# Patient Record
Sex: FEMALE | Race: AMERICAN INDIAN OR ALASKA NATIVE
[De-identification: names, ages, dates, MRNs, and addresses within clinical notes are randomized per-mention and may not be internally consistent; named-entity substitution may affect disease eponyms.]

---

## 2022-01-18 ENCOUNTER — HOSPITAL ENCOUNTER (INPATIENT)
Dept: HOSPITAL 26 - MED | Age: 72
LOS: 10 days | Discharge: HOME | DRG: 69 | End: 2022-01-28
Attending: INTERNAL MEDICINE | Admitting: INTERNAL MEDICINE
Payer: COMMERCIAL

## 2022-01-18 VITALS — HEIGHT: 62 IN | WEIGHT: 173 LBS | BODY MASS INDEX: 31.83 KG/M2

## 2022-01-18 VITALS — DIASTOLIC BLOOD PRESSURE: 68 MMHG | SYSTOLIC BLOOD PRESSURE: 179 MMHG

## 2022-01-18 DIAGNOSIS — G45.9: Primary | ICD-10-CM

## 2022-01-18 DIAGNOSIS — I10: ICD-10-CM

## 2022-01-18 DIAGNOSIS — G43.909: ICD-10-CM

## 2022-01-18 DIAGNOSIS — E78.5: ICD-10-CM

## 2022-01-18 DIAGNOSIS — Z20.822: ICD-10-CM

## 2022-01-18 DIAGNOSIS — Z79.899: ICD-10-CM

## 2022-01-18 DIAGNOSIS — Z91.013: ICD-10-CM

## 2022-01-18 DIAGNOSIS — Z88.0: ICD-10-CM

## 2022-01-18 LAB
ALBUMIN FLD-MCNC: 3.4 G/DL (ref 3.4–5)
ANION GAP SERPL CALCULATED.3IONS-SCNC: 11.5 MMOL/L (ref 8–16)
AST SERPL-CCNC: 39 U/L (ref 15–37)
BASOPHILS # BLD AUTO: 0 K/UL (ref 0–0.22)
BASOPHILS NFR BLD AUTO: 0.2 % (ref 0–2)
BILIRUB SERPL-MCNC: 0.5 MG/DL (ref 0–1)
BUN SERPL-MCNC: 13 MG/DL (ref 7–18)
CHLORIDE SERPL-SCNC: 103 MMOL/L (ref 98–107)
CHOLEST/HDLC SERPL: 3 {RATIO} (ref 1–4.5)
CO2 SERPL-SCNC: 28 MMOL/L (ref 21–32)
CREAT SERPL-MCNC: 0.7 MG/DL (ref 0.6–1.3)
EOSINOPHIL # BLD AUTO: 0.1 K/UL (ref 0–0.4)
EOSINOPHIL NFR BLD AUTO: 1.5 % (ref 0–4)
ERYTHROCYTE [DISTWIDTH] IN BLOOD BY AUTOMATED COUNT: 13.7 % (ref 11.6–13.7)
GFR SERPL CREATININE-BSD FRML MDRD: 107 ML/MIN (ref 90–?)
GLUCOSE SERPL-MCNC: 176 MG/DL (ref 74–106)
HCT VFR BLD AUTO: 36.2 % (ref 36–48)
HDLC SERPL-MCNC: 46 MG/DL (ref 40–60)
HGB BLD-MCNC: 12.1 G/DL (ref 12–16)
LDLC SERPL CALC-MCNC: 68 MG/DL (ref 60–100)
LYMPHOCYTES # BLD AUTO: 1.9 K/UL (ref 2.5–16.5)
LYMPHOCYTES NFR BLD AUTO: 29.9 % (ref 20.5–51.1)
MCH RBC QN AUTO: 32 PG (ref 27–31)
MCHC RBC AUTO-ENTMCNC: 33 G/DL (ref 33–37)
MCV RBC AUTO: 94.5 FL (ref 80–94)
MONOCYTES # BLD AUTO: 0.5 K/UL (ref 0.8–1)
MONOCYTES NFR BLD AUTO: 7.2 % (ref 1.7–9.3)
NEUTROPHILS # BLD AUTO: 3.8 K/UL (ref 1.8–7.7)
NEUTROPHILS NFR BLD AUTO: 61.2 % (ref 42.2–75.2)
PLATELET # BLD AUTO: 239 K/UL (ref 140–450)
POTASSIUM SERPL-SCNC: 4.5 MMOL/L (ref 3.5–5.1)
RBC # BLD AUTO: 3.83 MIL/UL (ref 4.2–5.4)
SODIUM SERPL-SCNC: 138 MMOL/L (ref 136–145)
TRIGL SERPL-MCNC: 130 MG/DL (ref 30–150)
WBC # BLD AUTO: 6.3 K/UL (ref 4.8–10.8)

## 2022-01-18 PROCEDURE — C9113 INJ PANTOPRAZOLE SODIUM, VIA: HCPCS

## 2022-01-18 PROCEDURE — G0378 HOSPITAL OBSERVATION PER HR: HCPCS

## 2022-01-18 NOTE — NUR
71/F BIB  WITH C/O HEADACHE, RIGHT ARM WEAKNESS AND CHEST PRESSURE X2 
DAYS, REPORTS THROBBING NON RADIATING PAIN THAT IS INTERMITTENT. PATIENT STATES 
SHE CALLED HER PCP TO INFORM HIM OF HER SYMPTOMS AND WAS REFERRED TO COME TO 
THE ED. PATIENT DENIES N/V/D, DIZZINESS, SOB, FEVERS OR CHILLS, PATIENT DENIES 
PAIN AT THIS TIME.

## 2022-01-18 NOTE — NUR
PATIENT SITTING UP IN CHAIR WITH EYES CLOSED, WILL CONTINUE TO MONITOR. ALL 
NEEDS MET AT THIS TIME.

## 2022-01-19 LAB
ALBUMIN FLD-MCNC: 3.4 G/DL (ref 3.4–5)
ANION GAP SERPL CALCULATED.3IONS-SCNC: 15.5 MMOL/L (ref 8–16)
AST SERPL-CCNC: 17 U/L (ref 15–37)
BASOPHILS # BLD AUTO: 0 K/UL (ref 0–0.22)
BASOPHILS NFR BLD AUTO: 0 % (ref 0–2)
BILIRUB SERPL-MCNC: 0.3 MG/DL (ref 0–1)
BUN SERPL-MCNC: 17 MG/DL (ref 7–18)
CHLORIDE SERPL-SCNC: 103 MMOL/L (ref 98–107)
CO2 SERPL-SCNC: 23.8 MMOL/L (ref 21–32)
CREAT SERPL-MCNC: 0.9 MG/DL (ref 0.6–1.3)
EOSINOPHIL # BLD AUTO: 0 K/UL (ref 0–0.4)
EOSINOPHIL NFR BLD AUTO: 0 % (ref 0–4)
ERYTHROCYTE [DISTWIDTH] IN BLOOD BY AUTOMATED COUNT: 14.1 % (ref 11.6–13.7)
GFR SERPL CREATININE-BSD FRML MDRD: 80 ML/MIN (ref 90–?)
GLUCOSE SERPL-MCNC: 272 MG/DL (ref 74–106)
HCT VFR BLD AUTO: 40.7 % (ref 36–48)
HGB BLD-MCNC: 13.5 G/DL (ref 12–16)
LYMPHOCYTES # BLD AUTO: 1.2 K/UL (ref 2.5–16.5)
LYMPHOCYTES NFR BLD AUTO: 12 % (ref 20.5–51.1)
MCH RBC QN AUTO: 32 PG (ref 27–31)
MCHC RBC AUTO-ENTMCNC: 33 G/DL (ref 33–37)
MCV RBC AUTO: 95.4 FL (ref 80–94)
MONOCYTES # BLD AUTO: 0.2 K/UL (ref 0.8–1)
MONOCYTES NFR BLD AUTO: 1.9 % (ref 1.7–9.3)
NEUTROPHILS # BLD AUTO: 8.6 K/UL (ref 1.8–7.7)
NEUTROPHILS NFR BLD AUTO: 86.1 % (ref 42.2–75.2)
PLATELET # BLD AUTO: 277 K/UL (ref 140–450)
POTASSIUM SERPL-SCNC: 4.3 MMOL/L (ref 3.5–5.1)
RBC # BLD AUTO: 4.26 MIL/UL (ref 4.2–5.4)
SODIUM SERPL-SCNC: 138 MMOL/L (ref 136–145)
WBC # BLD AUTO: 10 K/UL (ref 4.8–10.8)

## 2022-01-19 RX ADMIN — DOCUSATE SODIUM SCH MG: 100 CAPSULE, LIQUID FILLED ORAL at 13:06

## 2022-01-19 NOTE — NUR
PATIENT HAS BEEN SCREENED AND CATEGORIZED AS MODERATE NUTRITION RISK. PATIENT WILL BE SEEN 
WITHIN 3-5 DAYS OF ADMISSION.



1/19/221/23/22



NYLA GRAJEDA RD

## 2022-01-20 LAB
ALBUMIN FLD-MCNC: 2.9 G/DL (ref 3.4–5)
ANION GAP SERPL CALCULATED.3IONS-SCNC: 12.9 MMOL/L (ref 8–16)
AST SERPL-CCNC: 15 U/L (ref 15–37)
BASOPHILS # BLD AUTO: 0 K/UL (ref 0–0.22)
BASOPHILS NFR BLD AUTO: 0.4 % (ref 0–2)
BILIRUB SERPL-MCNC: 0.3 MG/DL (ref 0–1)
BUN SERPL-MCNC: 23 MG/DL (ref 7–18)
CHLORIDE SERPL-SCNC: 108 MMOL/L (ref 98–107)
CO2 SERPL-SCNC: 27.2 MMOL/L (ref 21–32)
CREAT SERPL-MCNC: 0.9 MG/DL (ref 0.6–1.3)
EOSINOPHIL # BLD AUTO: 0 K/UL (ref 0–0.4)
EOSINOPHIL NFR BLD AUTO: 0.3 % (ref 0–4)
ERYTHROCYTE [DISTWIDTH] IN BLOOD BY AUTOMATED COUNT: 14.2 % (ref 11.6–13.7)
GFR SERPL CREATININE-BSD FRML MDRD: 80 ML/MIN (ref 90–?)
GLUCOSE SERPL-MCNC: 137 MG/DL (ref 74–106)
HCT VFR BLD AUTO: 37.6 % (ref 36–48)
HGB BLD-MCNC: 12.3 G/DL (ref 12–16)
LYMPHOCYTES # BLD AUTO: 2.6 K/UL (ref 2.5–16.5)
LYMPHOCYTES NFR BLD AUTO: 24.3 % (ref 20.5–51.1)
MCH RBC QN AUTO: 31 PG (ref 27–31)
MCHC RBC AUTO-ENTMCNC: 33 G/DL (ref 33–37)
MCV RBC AUTO: 95.9 FL (ref 80–94)
MONOCYTES # BLD AUTO: 0.7 K/UL (ref 0.8–1)
MONOCYTES NFR BLD AUTO: 6.4 % (ref 1.7–9.3)
NEUTROPHILS # BLD AUTO: 7.2 K/UL (ref 1.8–7.7)
NEUTROPHILS NFR BLD AUTO: 68.6 % (ref 42.2–75.2)
PLATELET # BLD AUTO: 219 K/UL (ref 140–450)
POTASSIUM SERPL-SCNC: 4.1 MMOL/L (ref 3.5–5.1)
RBC # BLD AUTO: 3.93 MIL/UL (ref 4.2–5.4)
SODIUM SERPL-SCNC: 144 MMOL/L (ref 136–145)
WBC # BLD AUTO: 10.5 K/UL (ref 4.8–10.8)

## 2022-01-20 RX ADMIN — ASPIRIN SCH MG: 325 TABLET, COATED ORAL at 09:02

## 2022-01-20 RX ADMIN — DOCUSATE SODIUM SCH MG: 100 CAPSULE, LIQUID FILLED ORAL at 09:02

## 2022-01-20 NOTE — NUR
REPORT RECEIVED FROM ELO HUDSON FOR CONTINUITY OF CARE. PT IS A&OX4. ON ROOM AIR. 
IV SITE RT HAND 22G, INTACT, PATENT, GOOD BLOOD RETURN. SKIN INTACT, WARM AND 
DRY. WILL CONTINUE TO MONITOR.

## 2022-01-20 NOTE — NUR
Patient resting in bed. Vital Signs within normal limits. Respirations even and 
unlabored. Chest rise is symmetrical. Will continue to monitor.

## 2022-01-20 NOTE — NUR
DC PLANNING:

CM SPOKE WITH [facility], CM NOT AVAILABLE TO TALK, MESSAGE LEFT THAT NEUROLOGIST IS RECOMMENDING 
MRI, NEED AUTH AND DIRECTION ON WHERE TO SEND.

CM WILL FOLLOW.

-------------------------------------------------------------------------------

Addendum: 01/20/22 at 1547 by Nancy Reyes CM

-------------------------------------------------------------------------------

Called & spoke with Gabriele at [facility], ph [phone] opt 1 fax [phone], states not 
showing in system to fax face sheet & clinicals, she is the one following pt. Informed UR & 
going to fax. I faxed order/pt info to Gabriele for MRI.

-------------------------------------------------------------------------------

Addendum: 01/21/22 at 1659 by Maria Isabel Castañeda CM

-------------------------------------------------------------------------------

DC PLANNING:

CM SPOKE WITH GABRIELE AT [facility], NO CONTRACTED FACILITIES IN NETWORK ABLE TO DO MRI.

CM WILL FOLLOW.

-------------------------------------------------------------------------------

Addendum: 01/25/22 at 1502 by Maria Isabel Castañeda CM

-------------------------------------------------------------------------------

DC PLANNING:

MUKESH SPOKE WITH NOMI AT [facility] ([phone] OPT 1), NO CONTRACTED HOSPITALS AS YET TO 
TRANSFER THE PATIENT TO FOR AN MRI.

MUKESH WILL FOLLOW.

-------------------------------------------------------------------------------

Addendum: 01/26/22 at 1738 by Maria Isabel Castañeda 

-------------------------------------------------------------------------------

DC PLANNING:

MUKESH SPOKE WITH THE MEDICAL DIRECTOR DR HANEY AT [facility], NO CONTRACTED FACILITIES ARE ABLE TO 
ACCEPT THE PATIENT FOR MRI. DR BANDA SPOKE WITH DR BRAVO, HE WANTS THE MRI DONE BEFORE 
PATIENT DISCHARGES. MUKESH ATTEMPTED TO REACH [facility] TO ASK FOR AN AUTH TO SEND THE PATIENT TO 
[place] FOR HER MRI.

MUKESH WILL FOLLOW UP.

-------------------------------------------------------------------------------

Addendum: 01/27/22 at 1133 by Maria Isabel Castañeda CM

-------------------------------------------------------------------------------

DC PLANNING:

[facility] AUTH TO ARRANGE AN MRI AT 
[place] FOR THE PATIENT. AUTH # [id number], TRANSPORT AUTH WILL BE PROVIDED ONCE [facility] HAS A 
SCHEDULED TIME FOR . CM SPOKE WITH JOHNNIE, THEY WILL FAX THE MRI FORM, CM WILL THEN 
FAX COMPLETED CLINICAL PACKET TO THEM, AND WILL FOLLOW.

-------------------------------------------------------------------------------

Addendum: 01/27/22 at 1402 by Maria Isabel Castañeda CM

-------------------------------------------------------------------------------

DC PLANNING:

PATIENT TO BE PICKED UP BY [facility] BLS LEVEL AT 1550, [facility] SCHEDULED FOR A WAIT AND RETURN 
TRANSPORT. AUTH NUMBER FOR [facility] PER GABRIELE AT [facility] [id number], GABRIELE WILL FAX THE AUTH 
DIRECTLY TO [facility].

ABOVE ENDORSED TO PATIENTS NURSE FLO CM TO FOLLOW.

## 2022-01-20 NOTE — NUR
CALLED PT'S [facility]. UPDATED ON PT STATUS, ALL QUESTIONS AND CONCERNS 
ANSWERED AT THIS TIME.

## 2022-01-21 VITALS — DIASTOLIC BLOOD PRESSURE: 71 MMHG | SYSTOLIC BLOOD PRESSURE: 165 MMHG

## 2022-01-21 VITALS — SYSTOLIC BLOOD PRESSURE: 132 MMHG | DIASTOLIC BLOOD PRESSURE: 63 MMHG

## 2022-01-21 VITALS — SYSTOLIC BLOOD PRESSURE: 154 MMHG | DIASTOLIC BLOOD PRESSURE: 61 MMHG

## 2022-01-21 VITALS — SYSTOLIC BLOOD PRESSURE: 127 MMHG | DIASTOLIC BLOOD PRESSURE: 69 MMHG

## 2022-01-21 VITALS — SYSTOLIC BLOOD PRESSURE: 152 MMHG | DIASTOLIC BLOOD PRESSURE: 52 MMHG

## 2022-01-21 LAB
ALBUMIN FLD-MCNC: 3 G/DL (ref 3.4–5)
ANION GAP SERPL CALCULATED.3IONS-SCNC: 11.7 MMOL/L (ref 8–16)
AST SERPL-CCNC: 14 U/L (ref 15–37)
BASOPHILS # BLD AUTO: 0 K/UL (ref 0–0.22)
BASOPHILS NFR BLD AUTO: 0.3 % (ref 0–2)
BILIRUB SERPL-MCNC: 0.3 MG/DL (ref 0–1)
BUN SERPL-MCNC: 20 MG/DL (ref 7–18)
CHLORIDE SERPL-SCNC: 107 MMOL/L (ref 98–107)
CO2 SERPL-SCNC: 28.2 MMOL/L (ref 21–32)
CREAT SERPL-MCNC: 0.8 MG/DL (ref 0.6–1.3)
EOSINOPHIL # BLD AUTO: 0.1 K/UL (ref 0–0.4)
EOSINOPHIL NFR BLD AUTO: 1.2 % (ref 0–4)
ERYTHROCYTE [DISTWIDTH] IN BLOOD BY AUTOMATED COUNT: 14.2 % (ref 11.6–13.7)
GFR SERPL CREATININE-BSD FRML MDRD: 91 ML/MIN (ref 90–?)
GLUCOSE SERPL-MCNC: 143 MG/DL (ref 74–106)
HCT VFR BLD AUTO: 36.9 % (ref 36–48)
HGB BLD-MCNC: 12.4 G/DL (ref 12–16)
LYMPHOCYTES # BLD AUTO: 2.2 K/UL (ref 2.5–16.5)
LYMPHOCYTES NFR BLD AUTO: 28.4 % (ref 20.5–51.1)
MCH RBC QN AUTO: 32 PG (ref 27–31)
MCHC RBC AUTO-ENTMCNC: 34 G/DL (ref 33–37)
MCV RBC AUTO: 95 FL (ref 80–94)
MONOCYTES # BLD AUTO: 0.5 K/UL (ref 0.8–1)
MONOCYTES NFR BLD AUTO: 6.5 % (ref 1.7–9.3)
NEUTROPHILS # BLD AUTO: 5 K/UL (ref 1.8–7.7)
NEUTROPHILS NFR BLD AUTO: 63.6 % (ref 42.2–75.2)
PLATELET # BLD AUTO: 238 K/UL (ref 140–450)
POTASSIUM SERPL-SCNC: 3.9 MMOL/L (ref 3.5–5.1)
RBC # BLD AUTO: 3.89 MIL/UL (ref 4.2–5.4)
SODIUM SERPL-SCNC: 143 MMOL/L (ref 136–145)
WBC # BLD AUTO: 7.8 K/UL (ref 4.8–10.8)

## 2022-01-21 RX ADMIN — ASPIRIN SCH MG: 325 TABLET, COATED ORAL at 09:33

## 2022-01-21 RX ADMIN — DOCUSATE SODIUM SCH MG: 100 CAPSULE, LIQUID FILLED ORAL at 09:33

## 2022-01-21 RX ADMIN — ENOXAPARIN SODIUM SCH MG: 40 INJECTION SUBCUTANEOUS at 22:30

## 2022-01-21 NOTE — NUR
PATIENT TO FLOOR 2225 AWAKE ALERT NO C/O PATIENT STATES PUT GOWN ON PATIENT AND FALL RISK 
SLIPPERS. PATIENT CAN AMBULATE WELL STEADY ON FEET. PATIENT IS ROOM AIR, SAT 97 %. PATIENT 
LUNGS DIMINISH TO LISTEN SKIN INTACT. SINUS 

ON MONITOR. PATIENT HAS INFLUENZA B BUT DON,T HEAR A COUGH.. PATIENT IS COVID _.NO DISTRESS 
NOTED. PATIENT CAME 

FROM [facility] 2225.

## 2022-01-21 NOTE — NUR
Discharge Planning:

FROY has contacted [facility] ([phone] [facility]) 4 times and left message regarding pt's 
need for MRI. FROY received call back from [facility] stating that [facility] has no record of this 
patient being admitted to [place]. FROY reviewed admitting documentation over phone with 
[facility]. FROY has asked [facility] Admitting to assist with notifying [facility]. [facility] stated that she 
cannot assist with anything until patient is showing in her system. FROY reviewed notes that 
clinicals have been faxed to [facility]. 

-------------------------------------------------------------------------------

Addendum: 01/21/22 at 1551 by Tanya Daigle CM

-------------------------------------------------------------------------------

FROY received call from [facility]; pt was found in their system; Tracking Number: [id number]. 
[facility] has asked [facility] to work on finding a hospital where the pt can have MRI.

## 2022-01-22 VITALS — SYSTOLIC BLOOD PRESSURE: 134 MMHG | DIASTOLIC BLOOD PRESSURE: 60 MMHG

## 2022-01-22 VITALS — DIASTOLIC BLOOD PRESSURE: 61 MMHG | SYSTOLIC BLOOD PRESSURE: 130 MMHG

## 2022-01-22 VITALS — DIASTOLIC BLOOD PRESSURE: 76 MMHG | SYSTOLIC BLOOD PRESSURE: 158 MMHG

## 2022-01-22 VITALS — SYSTOLIC BLOOD PRESSURE: 158 MMHG | DIASTOLIC BLOOD PRESSURE: 72 MMHG

## 2022-01-22 VITALS — DIASTOLIC BLOOD PRESSURE: 57 MMHG | SYSTOLIC BLOOD PRESSURE: 136 MMHG

## 2022-01-22 VITALS — SYSTOLIC BLOOD PRESSURE: 140 MMHG | DIASTOLIC BLOOD PRESSURE: 58 MMHG

## 2022-01-22 LAB
ALBUMIN FLD-MCNC: 2.9 G/DL (ref 3.4–5)
ANION GAP SERPL CALCULATED.3IONS-SCNC: 11.3 MMOL/L (ref 8–16)
AST SERPL-CCNC: 18 U/L (ref 15–37)
BASOPHILS # BLD AUTO: 0 K/UL (ref 0–0.22)
BASOPHILS NFR BLD AUTO: 0.3 % (ref 0–2)
BILIRUB SERPL-MCNC: 0.3 MG/DL (ref 0–1)
BUN SERPL-MCNC: 18 MG/DL (ref 7–18)
CHLORIDE SERPL-SCNC: 106 MMOL/L (ref 98–107)
CO2 SERPL-SCNC: 27.6 MMOL/L (ref 21–32)
CREAT SERPL-MCNC: 0.8 MG/DL (ref 0.6–1.3)
EOSINOPHIL # BLD AUTO: 0.1 K/UL (ref 0–0.4)
EOSINOPHIL NFR BLD AUTO: 1.4 % (ref 0–4)
ERYTHROCYTE [DISTWIDTH] IN BLOOD BY AUTOMATED COUNT: 14 % (ref 11.6–13.7)
GFR SERPL CREATININE-BSD FRML MDRD: 91 ML/MIN (ref 90–?)
GLUCOSE SERPL-MCNC: 136 MG/DL (ref 74–106)
HCT VFR BLD AUTO: 35.6 % (ref 36–48)
HGB BLD-MCNC: 12.2 G/DL (ref 12–16)
LYMPHOCYTES # BLD AUTO: 2 K/UL (ref 2.5–16.5)
LYMPHOCYTES NFR BLD AUTO: 30 % (ref 20.5–51.1)
MCH RBC QN AUTO: 32 PG (ref 27–31)
MCHC RBC AUTO-ENTMCNC: 34 G/DL (ref 33–37)
MCV RBC AUTO: 94.2 FL (ref 80–94)
MONOCYTES # BLD AUTO: 0.5 K/UL (ref 0.8–1)
MONOCYTES NFR BLD AUTO: 6.7 % (ref 1.7–9.3)
NEUTROPHILS # BLD AUTO: 4.2 K/UL (ref 1.8–7.7)
NEUTROPHILS NFR BLD AUTO: 61.6 % (ref 42.2–75.2)
PLATELET # BLD AUTO: 253 K/UL (ref 140–450)
POTASSIUM SERPL-SCNC: 3.9 MMOL/L (ref 3.5–5.1)
RBC # BLD AUTO: 3.78 MIL/UL (ref 4.2–5.4)
SODIUM SERPL-SCNC: 141 MMOL/L (ref 136–145)
WBC # BLD AUTO: 6.8 K/UL (ref 4.8–10.8)

## 2022-01-22 RX ADMIN — ENOXAPARIN SODIUM SCH MG: 40 INJECTION SUBCUTANEOUS at 08:47

## 2022-01-22 RX ADMIN — DOCUSATE SODIUM SCH MG: 100 CAPSULE, LIQUID FILLED ORAL at 08:46

## 2022-01-22 RX ADMIN — ASPIRIN SCH MG: 325 TABLET, COATED ORAL at 08:47

## 2022-01-22 RX ADMIN — ATORVASTATIN CALCIUM SCH MG: 20 TABLET, FILM COATED ORAL at 08:47

## 2022-01-22 NOTE — NUR
RECVEIVED PT FROM  NIGHT RN, PT IS AWAKE AND SEATED ON THE BED, SIDE RAILS ARE UP ANSD CALL 
LIGHT WITHIN REACH, IV LINE NOTED ON THE LEFT HAND G. 24 ON SALINE LOCK, PT IS ON RA, NO 
SIGN OF DISTRESS NOTED AND WILL CONTINUE TO MONITOR PT.

## 2022-01-22 NOTE — NUR
DUE MEDICATION GIVEN. PATIENT AWAKE, ALERT ON ROOM AIR. NO SOB NOTED. SAFETY MEASURES IN 
PLACE. CALL LIGHT WITHIN REACH.

## 2022-01-23 VITALS — DIASTOLIC BLOOD PRESSURE: 70 MMHG | SYSTOLIC BLOOD PRESSURE: 148 MMHG

## 2022-01-23 VITALS — SYSTOLIC BLOOD PRESSURE: 152 MMHG | DIASTOLIC BLOOD PRESSURE: 62 MMHG

## 2022-01-23 VITALS — SYSTOLIC BLOOD PRESSURE: 154 MMHG | DIASTOLIC BLOOD PRESSURE: 72 MMHG

## 2022-01-23 VITALS — DIASTOLIC BLOOD PRESSURE: 58 MMHG | SYSTOLIC BLOOD PRESSURE: 127 MMHG

## 2022-01-23 VITALS — DIASTOLIC BLOOD PRESSURE: 57 MMHG | SYSTOLIC BLOOD PRESSURE: 148 MMHG

## 2022-01-23 VITALS — DIASTOLIC BLOOD PRESSURE: 58 MMHG | SYSTOLIC BLOOD PRESSURE: 140 MMHG

## 2022-01-23 LAB
ALBUMIN FLD-MCNC: 2.8 G/DL (ref 3.4–5)
ANION GAP SERPL CALCULATED.3IONS-SCNC: 11.3 MMOL/L (ref 8–16)
AST SERPL-CCNC: 18 U/L (ref 15–37)
BASOPHILS # BLD AUTO: 0 K/UL (ref 0–0.22)
BASOPHILS NFR BLD AUTO: 0.2 % (ref 0–2)
BILIRUB SERPL-MCNC: 0.4 MG/DL (ref 0–1)
BUN SERPL-MCNC: 17 MG/DL (ref 7–18)
CHLORIDE SERPL-SCNC: 107 MMOL/L (ref 98–107)
CO2 SERPL-SCNC: 28.6 MMOL/L (ref 21–32)
CREAT SERPL-MCNC: 0.8 MG/DL (ref 0.6–1.3)
EOSINOPHIL # BLD AUTO: 0.1 K/UL (ref 0–0.4)
EOSINOPHIL NFR BLD AUTO: 1.8 % (ref 0–4)
ERYTHROCYTE [DISTWIDTH] IN BLOOD BY AUTOMATED COUNT: 13.9 % (ref 11.6–13.7)
GFR SERPL CREATININE-BSD FRML MDRD: 91 ML/MIN (ref 90–?)
GLUCOSE SERPL-MCNC: 142 MG/DL (ref 74–106)
HCT VFR BLD AUTO: 38.9 % (ref 36–48)
HGB BLD-MCNC: 13.1 G/DL (ref 12–16)
LYMPHOCYTES # BLD AUTO: 1.9 K/UL (ref 2.5–16.5)
LYMPHOCYTES NFR BLD AUTO: 27.9 % (ref 20.5–51.1)
MCH RBC QN AUTO: 32 PG (ref 27–31)
MCHC RBC AUTO-ENTMCNC: 34 G/DL (ref 33–37)
MCV RBC AUTO: 94.3 FL (ref 80–94)
MONOCYTES # BLD AUTO: 0.5 K/UL (ref 0.8–1)
MONOCYTES NFR BLD AUTO: 6.9 % (ref 1.7–9.3)
NEUTROPHILS # BLD AUTO: 4.3 K/UL (ref 1.8–7.7)
NEUTROPHILS NFR BLD AUTO: 63.2 % (ref 42.2–75.2)
PLATELET # BLD AUTO: 247 K/UL (ref 140–450)
POTASSIUM SERPL-SCNC: 3.9 MMOL/L (ref 3.5–5.1)
RBC # BLD AUTO: 4.13 MIL/UL (ref 4.2–5.4)
SODIUM SERPL-SCNC: 143 MMOL/L (ref 136–145)
WBC # BLD AUTO: 6.8 K/UL (ref 4.8–10.8)

## 2022-01-23 RX ADMIN — ATORVASTATIN CALCIUM SCH MG: 20 TABLET, FILM COATED ORAL at 08:39

## 2022-01-23 RX ADMIN — ASPIRIN SCH MG: 325 TABLET, COATED ORAL at 08:40

## 2022-01-23 RX ADMIN — DOCUSATE SODIUM SCH MG: 100 CAPSULE, LIQUID FILLED ORAL at 08:38

## 2022-01-23 RX ADMIN — ENOXAPARIN SODIUM SCH MG: 40 INJECTION SUBCUTANEOUS at 08:40

## 2022-01-23 NOTE — NUR
RECEIVED REPORT FROM AM SHIFT FOR CONTINUITY OF CARE. PATIENT IN BED AWAKE, ALERT AND 
VERBALLY RESPONSIVE. ABLE TO MAKE NEEDS KNOWN. NOT IN DISTRESS. DENIES ANY PAIN AT THIS 
TIME. BED IN LOW POSITION. ALL SAFETY MEASURES IN PLACE. CALL LIGHT WITHIN REACH. WILL 
CONTINUE WITH CURRENT PLAN OF CARE.

## 2022-01-23 NOTE — NUR
1/23/22 RD INITIAL ASSESSMENT COMPLETED



PLEASE REFER TO NUTRITION ASSESSMENT UNDER CARE ACTIVITY FOR ESTIMATED NUTRITIONAL NEEDS. 



RD RECOMMENDATIONS:



1. CONTINUE CARDIAC DIET AS TOLERATED

2. RD TO F/U IN 7 DAYS; LOW RISK 



LEANN RAPHAEL, RD

## 2022-01-23 NOTE — NUR
ALL DUE MEDICATIONS GIVEN AS PER MD ORDER. TOLERATED WELL. NO ASE NOTED. CALL LIGHT WITHIN 
REACH. WILL CONTINUE TO MONITOR.

## 2022-01-23 NOTE — NUR
RECEIVED REPORT FROM NIGHT SHIFT FOR CONTINUITY OF CARE. PATIENT ALERT AWAKE ORIENTED NOT IN 
ANY DISTRESS NOTED. WITH  SALINE LOCK ON HER LEFT WRIST G. 24 DRY AND INTACT. DENIES ANY 
PAIN AT THIS TIME. ON MONITOR SHOWS SR. BED IN LOW POSITION. CALL LIGHT WITHIN REACH. NEEDS 
ATTENDED. WILL CONTINUE TO MONITOR.

## 2022-01-24 VITALS — SYSTOLIC BLOOD PRESSURE: 151 MMHG | DIASTOLIC BLOOD PRESSURE: 76 MMHG

## 2022-01-24 VITALS — DIASTOLIC BLOOD PRESSURE: 66 MMHG | SYSTOLIC BLOOD PRESSURE: 143 MMHG

## 2022-01-24 VITALS — DIASTOLIC BLOOD PRESSURE: 65 MMHG | SYSTOLIC BLOOD PRESSURE: 140 MMHG

## 2022-01-24 RX ADMIN — ASPIRIN SCH MG: 325 TABLET, COATED ORAL at 09:05

## 2022-01-24 RX ADMIN — ENOXAPARIN SODIUM SCH MG: 40 INJECTION SUBCUTANEOUS at 09:06

## 2022-01-24 RX ADMIN — DOCUSATE SODIUM SCH MG: 100 CAPSULE, LIQUID FILLED ORAL at 09:05

## 2022-01-24 RX ADMIN — ATORVASTATIN CALCIUM SCH MG: 20 TABLET, FILM COATED ORAL at 09:06

## 2022-01-24 NOTE — NUR
CHECKED ON PATIENT. PATIENT ASLEEP WITH VISIBLE CHEST RISING AND FALLING. ALL SAFETY 
MEASURES IN PLACE. CALL LIGHT WITHIN REACH. WILL CONTINUE TO MONITOR.

## 2022-01-24 NOTE — NUR
Discharge Planning:

LCSW placed call to [facility] ([phone] option 1) LCSW was referred to MUKESH Caballero today. 
Ada stated that she would take down the patients information and would call back later.

## 2022-01-24 NOTE — NUR
DID ROUND ON PT. PT IN BED WATCHING TELEVISION AT THIS TIME. NO COMPLAINTS OF PAIN OR 
DISCOMFORT REPORTED. RESPIRATIONS ARE EVEN AND UNLABORED. WILL CONTINUE TO MONITOR.

## 2022-01-24 NOTE — NUR
ADMINISTERED ALL SCHEDULED MEDICATIONS. EDUCATED PT REGARDING MEDS ADMINISTERED. PT 
VERBALIZED UNDERSTANDING. WILL CONTINUE TO MONITOR.

## 2022-01-24 NOTE — NUR
ROUNDED ON PATIENT. STABLE AND ASLEEP. BED IN LOW POSITION. BREATHING EVEN AND UNLABORED 
WITH NO SOB NOTED. ALL SAFETY MEASURES IN PLACE. CALL LIGHT WITHIN REACH. WILL CONTINUE TO 
MONITOR.

## 2022-01-24 NOTE — NUR
PT CALLED TO ASK FOR MORE WATER. GAVE PT WATER. ASKED PT IF SHE NEEDED ANYTHING. PT STATED 
"NO IM OK". NO COMPLAINTS OF PAIN OR DISCOMFORT AT THIS TIME. RESPIRATIONS ARE EVEN AND 
UNLABORED. WILL CONTINUE TO MONITOR.

## 2022-01-25 VITALS — SYSTOLIC BLOOD PRESSURE: 126 MMHG | DIASTOLIC BLOOD PRESSURE: 58 MMHG

## 2022-01-25 VITALS — DIASTOLIC BLOOD PRESSURE: 62 MMHG | SYSTOLIC BLOOD PRESSURE: 131 MMHG

## 2022-01-25 VITALS — SYSTOLIC BLOOD PRESSURE: 155 MMHG | DIASTOLIC BLOOD PRESSURE: 59 MMHG

## 2022-01-25 RX ADMIN — ASPIRIN SCH MG: 325 TABLET, COATED ORAL at 09:38

## 2022-01-25 RX ADMIN — DOCUSATE SODIUM SCH MG: 100 CAPSULE, LIQUID FILLED ORAL at 09:38

## 2022-01-25 RX ADMIN — ATORVASTATIN CALCIUM SCH MG: 20 TABLET, FILM COATED ORAL at 09:39

## 2022-01-25 RX ADMIN — ENOXAPARIN SODIUM SCH MG: 40 INJECTION SUBCUTANEOUS at 09:39

## 2022-01-25 NOTE — NUR
RECEIVED REPORT FROM NIGHT SHIFT NURSE FOR CONTINUITY OF CARE. PT IN BED AT THIS TIME 
WATCHING TELEVISION. RESPIRATIONS ARE EVEN AND UNLABORED. NO SIGNS OF DISTRESS NOTED. NO 
COMPLAINTS OF PAIN OR DISCOMFORT REPORTED. PT HAS IV TO L WRIST, 24G, SALINE LOCKED. PT IS 
ABLE TO AMBULATE WITH ASSISTANCE. ENCOURAGED PT TO CALL FOR ASSISTANCE FOR AMBULATION. CALL 
LIGHT WITHIN REACH. ALL SAFETY MEASURES IN PLACE. WILL CONTINUE TO MONITOR.

## 2022-01-25 NOTE — NUR
ADMINISTERED ALL SCHEDULED MEDICATIONS. EDUCATED PT REGARDING MEDS ADMINISTERED. ANSWERED 
ALL QUESTIONS. PT TOLERATED WELL. WILL CONTINUE TO MONITOR.

## 2022-01-25 NOTE — NUR
DID ROUNDS ON PT. PT IN HER ROOM SITTING ON CHAIR WATCHING TELEVISION. RESPIRATIONS ARE EVEN 
AND UNLABORED. NO SIGNS OF DISTRESS NOTED. NO COMPLAINTS OF PAIN OR DISCOMFORT NOTED. CALL 
LIGHT WITHIN REACH. ALL SAFETY MEASURES IN PLACE. WILL CONTINUE TO MONITOR.

## 2022-01-25 NOTE — NUR
PT IS AWAKE AND ALERT. IN HER ROOM WATCHING TELEVISION. NO COMPLAINTS OF PAIN OR DISCOMFORT. 
RESPIRATIONS ARE EVEN AND UNLABORED. NO SIGNS OF DISTRESS NOTED. WILL ENDORSE TO NIGHT SHIFT 
NURSE. PT IS STABLE.

## 2022-01-25 NOTE — NUR
DID ROUNDS ON PT. ASSISTED PT TO REST ROOM. NO COMPLAINTS OF PAIN OR DISCOMFORT. WILL 
CONTINUE TO MONITOR.

## 2022-01-26 VITALS — SYSTOLIC BLOOD PRESSURE: 143 MMHG | DIASTOLIC BLOOD PRESSURE: 51 MMHG

## 2022-01-26 VITALS — SYSTOLIC BLOOD PRESSURE: 156 MMHG | DIASTOLIC BLOOD PRESSURE: 62 MMHG

## 2022-01-26 VITALS — DIASTOLIC BLOOD PRESSURE: 50 MMHG | SYSTOLIC BLOOD PRESSURE: 134 MMHG

## 2022-01-26 RX ADMIN — DOCUSATE SODIUM SCH MG: 100 CAPSULE, LIQUID FILLED ORAL at 09:35

## 2022-01-26 RX ADMIN — ATORVASTATIN CALCIUM SCH MG: 20 TABLET, FILM COATED ORAL at 09:35

## 2022-01-26 RX ADMIN — ENOXAPARIN SODIUM SCH MG: 40 INJECTION SUBCUTANEOUS at 09:35

## 2022-01-26 RX ADMIN — ASPIRIN SCH MG: 325 TABLET, COATED ORAL at 09:35

## 2022-01-26 NOTE — NUR
RECEIVED PT LYING IN BED, AAOX4. DENIES HEADACHE/DIZZINESS. ABLE TO FOLLOW COMMANDS. NO 
FACIAL DROOP NOTED. NOTED RIGHT HAND  IS WEAKER THAN THE LEFT. NO ARM DRIFT NOTED. ABLE 
TO MOVE ALL EXTREMITIES. DENIES CHEST PAIN/PRESSURE. DENIES ABDOMINAL DISCOMFORT. BOWEL 
SOUNDS ACTIVE. LAST BM TODAY. VOIDS FREELY. W/ ECCHYMOSIS ON BUE, ANGELLA. IV SITE ON THE RFA 
GAUGE 24 IS PATENT AND INTACT. SIDE RAILS UPX2. CALL LIGHT ON REACH. BED ON THE LOWEST 
POSITION. QK=814/50, ME=71, O2 SAT=96% RA, RR=18, TEMP=98.2. WILL CONT TO MONITOR

## 2022-01-26 NOTE — NUR
PATIENT LYING DOWN IN BED TALKING ON THE PHONE, NO DISTRESS NOTED. DENIES ANY PAIN. WILL 
CONTINUE TO MONITOR.

## 2022-01-27 VITALS — DIASTOLIC BLOOD PRESSURE: 66 MMHG | SYSTOLIC BLOOD PRESSURE: 125 MMHG

## 2022-01-27 VITALS — DIASTOLIC BLOOD PRESSURE: 58 MMHG | SYSTOLIC BLOOD PRESSURE: 118 MMHG

## 2022-01-27 RX ADMIN — ATORVASTATIN CALCIUM SCH MG: 20 TABLET, FILM COATED ORAL at 08:48

## 2022-01-27 RX ADMIN — ASPIRIN SCH MG: 325 TABLET, COATED ORAL at 08:48

## 2022-01-27 RX ADMIN — ENOXAPARIN SODIUM SCH MG: 40 INJECTION SUBCUTANEOUS at 08:50

## 2022-01-27 RX ADMIN — DOCUSATE SODIUM SCH MG: 100 CAPSULE, LIQUID FILLED ORAL at 08:47

## 2022-01-27 NOTE — NUR
PT ON BED RESTING NOT ON ANY DISTRESS OR DISCOMFORT. NO COMPLAIN OF CHEST PAIN. ALL SAFETY 
MEASURE IN PLACE.

## 2022-01-27 NOTE — NUR
PATIENT AAOX4, NO C/O PAIN AND SOB. IV SITE PATENT AND INTACT. NEEDS ARE ATTENDED. CALL 
LIGHT ON REACH. WILL CONT TO MONITOR

## 2022-01-27 NOTE — NUR
PT ALERT ORIENTED BACK FROM  APPOINTMENT AT [facility] FOR HER MRI. NOT ON ANY 
DISCOMFORT.

## 2022-01-27 NOTE — NUR
PT ALERT AND ORIENTED ON STABLE CONDITION TRANSPORTED TO [facility] VIA GURNEY 
ACCOMPANIED BY 2 AMBULANCE STAFF.

## 2022-01-27 NOTE — NUR
INFORM PT THAT SHE GOING TO BE  AT 3:15 FOR HER MRI PROCEDURE AT [facility] PT 
VERBALIZED UNDERSTANDING.

## 2022-01-27 NOTE — NUR
ROUNDED ON PATIENT. SLEEPING WELL WITH VISIBLE CHEST RISING AND FALLING. NO SOB NOTED. CALL 
LIGHT WITHIN REACH. WILL CONTINUE TO MONITOR.

## 2022-01-27 NOTE — NUR
ALL DUE MEDICATIONS GIVEN AS PER MD ORDER. TOLERATED WELL. NO ASE NOTED. PATIENT DENIES ANY 
PAIN AT THIS TIME. CALL LIGHT WITHIN REACH. WILL CONTINUE TO MONITOR.

## 2022-01-27 NOTE — NUR
PT LYING IN BED, HAS HER EYES CLOSED, EASILY AROUSABLE TO VERBAL STIMULI. NO C/O PAIN AND 
SOB. NEEDS ARE ATTENDED. CALL LIGHT ON REACH.

## 2022-01-27 NOTE — NUR
RECEIVED PATIENT REPORT FROM AM SHIFT NURSE FOR CONTINUITY OF CARE. PATIENT IN BED AWAKE, 
ALERT AND VERBALLY RESPONSVIE. ABLE TO VERBALIZED NEEDS. A0X4. BREATHING EVEN AND UNLABORED 
WITH NO SOB NOTED. NOT IN DISTRESS. DENIES ANY PAIN AT THIS TIME. ALL SAFETY MEASURES IN 
PLACE. CALL LIGHT WITHIN REACH. WILL CONTINUE WITH CURRENT PLAN OF CARE.

## 2022-01-27 NOTE — NUR
RECEIVED ENDORSEMENT FROM NIGHT SHIFT NURSE FOR CONTINUITY OF CARE. PT AWAKE SITTING ON EDGE 
OF BED. ALL SAFETY MEASURE IN PLACE.

## 2022-01-27 NOTE — NUR
PT ON BED EATING DINNER NOT ON ANY DISTRESS OR DISCOMFORT. CALL LIGHT WITH IN EASY REACH. 
ALL SAFETY MEASURE IN PLACE. NO COMPLAIN OF CHEST PAIN.

## 2022-01-28 VITALS — DIASTOLIC BLOOD PRESSURE: 59 MMHG | SYSTOLIC BLOOD PRESSURE: 128 MMHG

## 2022-01-28 VITALS — DIASTOLIC BLOOD PRESSURE: 60 MMHG | SYSTOLIC BLOOD PRESSURE: 130 MMHG

## 2022-01-28 RX ADMIN — ENOXAPARIN SODIUM SCH MG: 40 INJECTION SUBCUTANEOUS at 08:28

## 2022-01-28 RX ADMIN — DOCUSATE SODIUM SCH MG: 100 CAPSULE, LIQUID FILLED ORAL at 08:26

## 2022-01-28 RX ADMIN — ASPIRIN SCH MG: 325 TABLET, COATED ORAL at 08:25

## 2022-01-28 RX ADMIN — ATORVASTATIN CALCIUM SCH MG: 20 TABLET, FILM COATED ORAL at 08:25

## 2022-01-28 NOTE — NUR
CHECKED ON PATIENT. SLEEPING WELL WITH VISIBLE CHEST RISING AND FALLING. NO SOB NOTED. CALL 
LIGHT WITHIN REACH. WILL CONTINUE TO MONITOR.

## 2022-01-28 NOTE — NUR
PT ON BED EATING LUNCH. DENIES PAIN OR ANY DISCOMFORT JUST SAYING THAT SHE HOPE EVERY THING 
IS OKAY AND SHE CANT GO HOME. ALL SAFETY MEASURE IN PLACE.

## 2022-01-28 NOTE — NUR
PT ALERT ORIENTED ABLE TO MAKE NEEDS KNOWN. ON STABLE CONDITION GIVEN DISCHARGE INSTRUCTION 
AND PACKET VERBALIZING UNDERSTANDING. UNDERSTANDING. ALL BELONGING TAKEN. IV DISCONTINUED 
WITH CATHETER INTACT. NAME BAND REMOVE. WHEEL PT TO THEIR PRIVATE VEHICLE.

## 2022-01-28 NOTE — NUR
PT ON BED RESTING NOT ON ANY DISTRESS OR DISCOMFORT. NO MRI RESULT. DENIES PAIN. NO SIGN AND 
SYMPTOMS OF BLEEDING. ALL SAFETY MEASURE IN PLACE.

## 2022-01-28 NOTE — NUR
PATIENT ASLEEP. RESPIRATION EVEN AND UNLABORED WITH NO SOB NOTED. ALL SAFETY MEASURES IN 
PLACE. CALL LIGHT WITHIN REACH. WILL CONTINUE TO MONITOR.

## 2022-01-28 NOTE — NUR
PT ALERT ORIENTED ABLE TO MAKE NEEDS KNOWN. ON STABLE CONDITION GIVEN DISCHARGE INSTRUCTION 
AND PACKET VERBALIZING UNDERSTANDING. UNDERSTANDING. ALL BELONGING TAKEN. IV DISCONTINUED 
WITH CATHETER INTACT. NAME BAND REMOVE. WHEEL PT TO THEIR PRIVATE VEHICLE.

-------------------------------------------------------------------------------

Addendum: 01/28/22 at 1827 by Raquel Hudson LVN

-------------------------------------------------------------------------------

WRONG TIME TO ENTER.

## 2022-01-28 NOTE — NUR
Discharge Planning:

Order for home healthcare faxed to [facility] ([phone] p.[phone]). LCSW will follow 
up to see which agency this has been assigned to.